# Patient Record
Sex: MALE | Race: BLACK OR AFRICAN AMERICAN | NOT HISPANIC OR LATINO | URBAN - METROPOLITAN AREA
[De-identification: names, ages, dates, MRNs, and addresses within clinical notes are randomized per-mention and may not be internally consistent; named-entity substitution may affect disease eponyms.]

---

## 2023-05-31 ENCOUNTER — EMERGENCY (EMERGENCY)
Facility: HOSPITAL | Age: 23
LOS: 0 days | Discharge: HOME | End: 2023-05-31
Payer: MEDICAID

## 2023-05-31 VITALS
DIASTOLIC BLOOD PRESSURE: 77 MMHG | TEMPERATURE: 98 F | HEART RATE: 64 BPM | SYSTOLIC BLOOD PRESSURE: 119 MMHG | WEIGHT: 214.95 LBS | HEIGHT: 73 IN | OXYGEN SATURATION: 100 % | RESPIRATION RATE: 18 BRPM

## 2023-05-31 VITALS
HEART RATE: 68 BPM | DIASTOLIC BLOOD PRESSURE: 73 MMHG | OXYGEN SATURATION: 100 % | TEMPERATURE: 98 F | RESPIRATION RATE: 18 BRPM | SYSTOLIC BLOOD PRESSURE: 121 MMHG

## 2023-05-31 DIAGNOSIS — Z20.822 CONTACT WITH AND (SUSPECTED) EXPOSURE TO COVID-19: ICD-10-CM

## 2023-05-31 DIAGNOSIS — J01.90 ACUTE SINUSITIS, UNSPECIFIED: ICD-10-CM

## 2023-05-31 DIAGNOSIS — R51.9 HEADACHE, UNSPECIFIED: ICD-10-CM

## 2023-05-31 DIAGNOSIS — R09.81 NASAL CONGESTION: ICD-10-CM

## 2023-05-31 LAB
ALBUMIN SERPL ELPH-MCNC: 3.5 G/DL — SIGNIFICANT CHANGE UP (ref 3.3–5)
ALP SERPL-CCNC: 80 U/L — SIGNIFICANT CHANGE UP (ref 40–120)
ALT FLD-CCNC: 25 U/L — SIGNIFICANT CHANGE UP (ref 12–78)
ANION GAP SERPL CALC-SCNC: 4 MMOL/L — LOW (ref 5–17)
AST SERPL-CCNC: 24 U/L — SIGNIFICANT CHANGE UP (ref 15–37)
BASOPHILS # BLD AUTO: 0.05 K/UL — SIGNIFICANT CHANGE UP (ref 0–0.2)
BASOPHILS NFR BLD AUTO: 0.4 % — SIGNIFICANT CHANGE UP (ref 0–2)
BILIRUB SERPL-MCNC: 0.4 MG/DL — SIGNIFICANT CHANGE UP (ref 0.2–1.2)
BUN SERPL-MCNC: 15 MG/DL — SIGNIFICANT CHANGE UP (ref 7–23)
CALCIUM SERPL-MCNC: 9.2 MG/DL — SIGNIFICANT CHANGE UP (ref 8.5–10.1)
CHLORIDE SERPL-SCNC: 105 MMOL/L — SIGNIFICANT CHANGE UP (ref 96–108)
CO2 SERPL-SCNC: 28 MMOL/L — SIGNIFICANT CHANGE UP (ref 22–31)
CREAT SERPL-MCNC: 1.18 MG/DL — SIGNIFICANT CHANGE UP (ref 0.5–1.3)
EGFR: 88 ML/MIN/1.73M2 — SIGNIFICANT CHANGE UP
EOSINOPHIL # BLD AUTO: 0.23 K/UL — SIGNIFICANT CHANGE UP (ref 0–0.5)
EOSINOPHIL NFR BLD AUTO: 1.7 % — SIGNIFICANT CHANGE UP (ref 0–6)
GLUCOSE SERPL-MCNC: 94 MG/DL — SIGNIFICANT CHANGE UP (ref 70–99)
HCT VFR BLD CALC: 45.4 % — SIGNIFICANT CHANGE UP (ref 39–50)
HGB BLD-MCNC: 14.7 G/DL — SIGNIFICANT CHANGE UP (ref 13–17)
HPIV3 RNA SPEC QL NAA+PROBE: DETECTED
IMM GRANULOCYTES NFR BLD AUTO: 0.5 % — SIGNIFICANT CHANGE UP (ref 0–0.9)
LYMPHOCYTES # BLD AUTO: 23.8 % — SIGNIFICANT CHANGE UP (ref 13–44)
LYMPHOCYTES # BLD AUTO: 3.14 K/UL — SIGNIFICANT CHANGE UP (ref 1–3.3)
MCHC RBC-ENTMCNC: 28.9 PG — SIGNIFICANT CHANGE UP (ref 27–34)
MCHC RBC-ENTMCNC: 32.4 G/DL — SIGNIFICANT CHANGE UP (ref 32–36)
MCV RBC AUTO: 89.2 FL — SIGNIFICANT CHANGE UP (ref 80–100)
MONOCYTES # BLD AUTO: 0.95 K/UL — HIGH (ref 0–0.9)
MONOCYTES NFR BLD AUTO: 7.2 % — SIGNIFICANT CHANGE UP (ref 2–14)
NEUTROPHILS # BLD AUTO: 8.74 K/UL — HIGH (ref 1.8–7.4)
NEUTROPHILS NFR BLD AUTO: 66.4 % — SIGNIFICANT CHANGE UP (ref 43–77)
NRBC # BLD: 0 /100 WBCS — SIGNIFICANT CHANGE UP (ref 0–0)
PLATELET # BLD AUTO: 232 K/UL — SIGNIFICANT CHANGE UP (ref 150–400)
POTASSIUM SERPL-MCNC: 4.2 MMOL/L — SIGNIFICANT CHANGE UP (ref 3.5–5.3)
POTASSIUM SERPL-SCNC: 4.2 MMOL/L — SIGNIFICANT CHANGE UP (ref 3.5–5.3)
PROT SERPL-MCNC: 8.1 GM/DL — SIGNIFICANT CHANGE UP (ref 6–8.3)
RAPID RVP RESULT: DETECTED
RBC # BLD: 5.09 M/UL — SIGNIFICANT CHANGE UP (ref 4.2–5.8)
RBC # FLD: 12.6 % — SIGNIFICANT CHANGE UP (ref 10.3–14.5)
SARS-COV-2 RNA SPEC QL NAA+PROBE: SIGNIFICANT CHANGE UP
SODIUM SERPL-SCNC: 137 MMOL/L — SIGNIFICANT CHANGE UP (ref 135–145)
WBC # BLD: 13.18 K/UL — HIGH (ref 3.8–10.5)
WBC # FLD AUTO: 13.18 K/UL — HIGH (ref 3.8–10.5)

## 2023-05-31 PROCEDURE — 99284 EMERGENCY DEPT VISIT MOD MDM: CPT

## 2023-05-31 PROCEDURE — 70450 CT HEAD/BRAIN W/O DYE: CPT | Mod: 26,MA

## 2023-05-31 RX ORDER — FLUTICASONE PROPIONATE 50 MCG
2 SPRAY, SUSPENSION NASAL
Qty: 1 | Refills: 0
Start: 2023-05-31

## 2023-05-31 RX ORDER — SODIUM CHLORIDE 9 MG/ML
1000 INJECTION INTRAMUSCULAR; INTRAVENOUS; SUBCUTANEOUS ONCE
Refills: 0 | Status: COMPLETED | OUTPATIENT
Start: 2023-05-31 | End: 2023-05-31

## 2023-05-31 RX ORDER — ACETAMINOPHEN 500 MG
1000 TABLET ORAL ONCE
Refills: 0 | Status: COMPLETED | OUTPATIENT
Start: 2023-05-31 | End: 2023-05-31

## 2023-05-31 RX ORDER — PSEUDOEPHEDRINE HCL 30 MG
60 TABLET ORAL ONCE
Refills: 0 | Status: COMPLETED | OUTPATIENT
Start: 2023-05-31 | End: 2023-05-31

## 2023-05-31 RX ORDER — IBUPROFEN 200 MG
1 TABLET ORAL
Qty: 16 | Refills: 0
Start: 2023-05-31 | End: 2023-06-03

## 2023-05-31 RX ADMIN — Medication 400 MILLIGRAM(S): at 16:46

## 2023-05-31 RX ADMIN — SODIUM CHLORIDE 1000 MILLILITER(S): 9 INJECTION INTRAMUSCULAR; INTRAVENOUS; SUBCUTANEOUS at 16:47

## 2023-05-31 RX ADMIN — Medication 60 MILLIGRAM(S): at 16:46

## 2023-05-31 RX ADMIN — Medication 1 TABLET(S): at 17:48

## 2023-05-31 NOTE — ED ADULT NURSE NOTE - NS ED NURSE IV DC DT
Location: Face
Debridement: No
Scalp Incubation Time: 2 Hours
Detail Level: Zone
Neck Incubation Time: 1 Hour
Pdt Type: EFREM-U
Frequency Of Pdt: Single Treatment
Face And Scalp Incubation Time: 1 Hour for the face and 2 Hours for the scalp
Consent: The procedure and risks were reviewed with the patient including but not limited to: burning, pigmentary changes, pain, blistering, scabbing, redness, and the possibility of needing numerous treatments. Strict photoprotection after the procedure was also discussed.
Face Incubation Time: 1.5 Hours
Photosensitizer: Levulan
31-May-2023 18:39

## 2023-05-31 NOTE — ED PROVIDER NOTE - CLINICAL SUMMARY MEDICAL DECISION MAKING FREE TEXT BOX
24 y/o male with no PMH presents with left sided headache with pressure in the left eye. Nasal congestion. NO neurological deficits. Vs stable.   Will check labs, ct head, ivf, tylenol and sudafed ordered. 24 y/o male with no PMH presents with left sided headache with pressure in the left eye. Nasal congestion. NO neurological deficits. Vs stable.   Will check labs, ct head, ivf, tylenol and sudafed ordered.    labs reviewed and wbc 13.   ct head: Abnormal calcification involving within the posterior fossa region as   described above.  Sinus disease as described above. pt made aware of ct findings. Symptoms likely related to sinusitis.  Pt stable to be discharged home and follow up with ENT.

## 2023-05-31 NOTE — ED ADULT NURSE NOTE - NSFALLHARMRISKINTERV_ED_ALL_ED

## 2023-05-31 NOTE — ED PROVIDER NOTE - NSFOLLOWUPINSTRUCTIONS_ED_ALL_ED_FT
Rest, drink plenty of fluids.  Advance activity as tolerated.  Continue all previously prescribed medications as directed.  Follow up with ENT in 48-72 hours- bring copies of your results.  Return to the ER for worsening or persistent symptoms, and/or ANY NEW OR CONCERNING SYMPTOMS. If you have issues obtaining follow up, please call: [phone] to obtain a doctor or specialist who takes your insurance in your area.  You may call [phone] to make an appointment with the internal medicine clinic.

## 2023-05-31 NOTE — ED PROVIDER NOTE - NS ED ROS FT
CONSTITUTIONAL: No fever, no chills, no fatigue  EYES: No visual changes  ENT: No ear pain, no sore throat  CARDIOVASCULAR: No chest pain, no palpitations  RESPIRATORY: No cough, no SOB  GI: No abdominal pain, no nausea, no vomiting, no constipation, no diarrhea  GENITOURINARY: No dysuria, no frequency, no hematuria  MUSKULOSKELETAL: No backpain, no joint pain, no myalgias  SKIN: No rash  NEURO: No dizziness     ALL OTHER SYSTEMS NEGATIVE.

## 2023-05-31 NOTE — ED PROVIDER NOTE - PATIENT PORTAL LINK FT
You can access the FollowMyHealth Patient Portal offered by [facility] by registering at the following website: [email or web address]. By joining [facility]’s FollowMyHealth portal, you will also be able to view your health information using other applications (apps) compatible with our system.

## 2023-05-31 NOTE — ED PROVIDER NOTE - PHYSICAL EXAMINATION
GEN: Awake, alert, interactive, NAD.  HEAD AND NECK: NC/AT. Airway patent. Neck supple.   EYES:  Clear b/l. EOMI. PERRL.   ENT: Moist mucus membranes. (+) nasal congestion  CARDIAC: Regular rate, regular rhythm. No evident pedal edema.    RESP/CHEST: Normal respiratory effort with no use of accessory muscles or retractions. Clear throughout on auscultation.  ABD: soft, non-distended, non-tender. No rebound, no guarding.   BACK: No midline spinal TTP. No CVAT.   EXTREMITIES: Moving all extremities with no apparent deformities.   SKIN: Warm, dry, intact normal color. No rash.   NEURO: AOx3, CN II-XII grossly intact, no focal deficits.   PSYCH: Appropriate mood and affect. GEN: Awake, alert, interactive, NAD.  HEAD AND NECK: NC/AT. Airway patent. Neck supple. (+) mild frontal tenderness to palpation.   EYES:  Clear b/l. EOMI. PERRL.   ENT: Moist mucus membranes. (+) nasal congestion  CARDIAC: Regular rate, regular rhythm. No evident pedal edema.    RESP/CHEST: Normal respiratory effort with no use of accessory muscles or retractions. Clear throughout on auscultation.  ABD: soft, non-distended, non-tender. No rebound, no guarding.   BACK: No midline spinal TTP. No CVAT.   EXTREMITIES: Moving all extremities with no apparent deformities.   SKIN: Warm, dry, intact normal color. No rash.   NEURO: AOx3, CN II-XII grossly intact, no focal deficits.   PSYCH: Appropriate mood and affect.

## 2023-05-31 NOTE — ED ADULT NURSE NOTE - OBJECTIVE STATEMENT
c/o l eye area pressure sensation with intermittent headaches x 5 days pain upon palpation denies any visual changes or deficits Pt is A&OX4, ambulatory. Complaining of left eye pressure and intermittent headache for 5 days. Also ear pain. Took pain meds with some relief. This morning pain increased. Denies visual changes. no pmh

## 2023-05-31 NOTE — ED ADULT TRIAGE NOTE - CHIEF COMPLAINT QUOTE
c/o l eye area pressure sensation with intermittent headaches x 5 days pain upon palpation denies any visual changes or deficits

## 2023-05-31 NOTE — ED PROVIDER NOTE - OBJECTIVE STATEMENT
22 y/o male with no PMH presents with left sided headache x 4 days. Pt reports having pressure in the left eye and forehead, on and off. Pt states he has been taking benadryl and claritin and ibuprofen with mild relief. Pt also reports having nasal congestion. Denies fever, chills, vision changes, nausea, vomiting, numbness, tingling. Pt denies flashing lights, floaters. Denies coughing

## 2023-05-31 NOTE — ED PROVIDER NOTE - CARE PROVIDER_API CALL
Tung Hinkle  Otolaryngology  [address], [place] [address]  Phone: [phone]  Fax: [phone]  Follow Up Time: 4-6 Days

## 2023-05-31 NOTE — ED PROVIDER NOTE - CARE PLAN
1 Principal Discharge DX:	Headache   Principal Discharge DX:	Headache  Secondary Diagnosis:	Acute sinusitis

## 2025-07-10 NOTE — ED ADULT NURSE NOTE - NSFALLRISK_ED_ALL_ED
EMERGENCY DEPARTMENT ENCOUNTER  Room Number:  16/16  PCP: Dennis Kwong MD  Independent Historians: Patient      HPI:  Chief Complaint: had concerns including Syncope and Hypotension.       Context: Omar Choudhary is a 76 y.o. male with a medical history of CABG, anticoagulated on warfarin, DVT, coronary artery disease who presents to the ED c/o acute hypotension and near syncope.  The patient reports that he went to the neurosurgery office today and his blood pressure was low.  He went to the cardiologist office and had a near syncopal episode.  He states that he was sitting in the waiting room and his name was called and he stood up and felt lightheaded.  The cardiologist did an echo and sent him here for further evaluation.  He received fluids in the cardiology office.  He denies any chest pain or shortness of breath.  He denies abdominal pain.  He denies nausea or vomiting.  He states he was sent home on Lasix as well as losartan hydrochlorothiazide.  He reports he had a CABG about 20 days ago.      Review of prior external notes (non-ED) -and- Review of prior external test results outside of this encounter: Laboratory evaluation earlier today shows a BMP with a creatinine of 1.61    Prescription drug monitoring program review:         PAST MEDICAL HISTORY  Active Ambulatory Problems     Diagnosis Date Noted    Benign prostatic hyperplasia 09/26/2019    Bursitis 11/11/2016    Depression 10/12/2012    Diverticulosis 09/26/2019    Family history of malignant neoplasm of breast 09/26/2019    Hyperlipidemia 09/26/2019    Hypertension 03/01/2012    Internal derangement of right knee 05/14/2019    Knee effusion 12/13/2012    Asthma 02/14/2019    Obstructive sleep apnea syndrome 09/18/2012    Osteoarthritis 09/26/2019    Osteopenia 09/26/2019    Pain in knee 11/08/2012    Postnasal drip 01/16/2018    Prepatellar bursitis 11/12/2012    Sciatica of right side 08/29/2017    Diaphragm paralysis 09/26/2019     Spinal stenosis of lumbar region with neurogenic claudication 08/12/2021    Degenerative lumbar spinal stenosis 08/12/2021    Anxiety 08/12/2021    Pinguecula of right eye 05/06/2021    Lumbar radiculopathy, chronic 09/26/2022    Bilateral pseudophakia 06/14/2022    Hemorrhoids without complication 02/29/2008    Lumbar degenerative disc disease 09/25/2023    Lumbar spondylosis 09/25/2023    Other fecal abnormalities 08/20/2013    Rectal bleeding 08/20/2013    Scoliosis of lumbosacral region due to degenerative disease of spine in adult 09/25/2023    Arthritis 09/25/2023    Acute right-sided low back pain without sciatica 01/02/2025    Sacroiliitis 01/02/2025    Acquired spondylolisthesis 01/02/2025    Acute cor pulmonale 01/14/2025    Chronic low back pain 01/14/2025    Anemia 01/15/2025    History of pulmonary embolism 01/19/2025    Acute respiratory failure with hypoxia 01/19/2025    Acute deep vein thrombosis (DVT) of femoral vein of right lower extremity 01/19/2025    CAD (coronary artery disease) 06/18/2025    Abnormal findings on diagnostic imaging of other specified body structures 06/02/2025    S/P CABG x 5 07/10/2025     Resolved Ambulatory Problems     Diagnosis Date Noted    Acute suppurative otitis media 09/24/2016    Otitis externa 01/13/2015    Otitis media 01/13/2015    Chronic cough 02/14/2019    Reactive airway disease 01/15/2016    Other specified dorsopathies, site unspecified 09/26/2019    Shortness of breath 07/24/2014    Weight loss 03/15/2013    Wheezing 03/01/2012    Asthma 11/16/2021    Obstructive sleep apnea syndrome 11/16/2021    Diverticulosis of colon 02/29/2008    Hypercholesterolemia 09/25/2023    Hypertension 09/25/2023    Acute saddle pulmonary embolism 01/10/2025    Pulmonary hypertension 03/06/2025     Past Medical History:   Diagnosis Date    ADHD (attention deficit hyperactivity disorder) 2021    Allergic 08/20/2018    Carpal tunnel syndrome     Cataract     Deep vein thrombosis  01/10/2025    Erectile dysfunction     Leg pain, right     Low back pain     Neuropathy     Poor balance     Pulmonary embolism     Scoliosis 1960    Sleep apnea     Substance abuse 1966         PAST SURGICAL HISTORY  Past Surgical History:   Procedure Laterality Date    CARDIAC CATHETERIZATION  1992    CARDIAC CATHETERIZATION N/A 01/11/2025    Procedure: Right Heart Cath;  Surgeon: Nancy Hdez MD;  Location:  [facility];  Service: Cardiovascular;  Laterality: N/A;    CARDIAC CATHETERIZATION  01/11/2025    Procedure: Percutaneous Manual Thrombectomy;  Surgeon: Nancy Hdez MD;  Location:  [facility];  Service: Cardiovascular;;    CARDIAC CATHETERIZATION Bilateral 01/11/2025    Procedure: Pulmonary angiography;  Surgeon: Nancy Hdez MD;  Location:  [facility];  Service: Cardiovascular;  Laterality: Bilateral;    CARDIAC CATHETERIZATION N/A 6/18/2025    Procedure: Right and Left Heart Cath;  Surgeon: Nancy Hdez MD;  Location:  [facility];  Service: Cardiovascular;  Laterality: N/A;    CARDIAC CATHETERIZATION N/A 6/18/2025    Procedure: Coronary angiography;  Surgeon: Nancy Hdez MD;  Location:  [facility];  Service: Cardiovascular;  Laterality: N/A;    COLONOSCOPY  2013    No polyps, slight diverticulitis    CORONARY ARTERY BYPASS GRAFT N/A 6/20/2025    Procedure: SHE STERNOTOMY CORONARY ARTERY BYPASS GRAFT TIMES 5 USING LEFT INTERNAL MAMMARY ARTERY AND LEFT GREATER SAPHENOUS VEIN GRAFT PER ENDOSCOPIC VEIN HARVESTING, CLOSURE OF PFO, EXCLUSION OF LEFT ATRIAL APPENDAGE AND PRP;  Surgeon: Jr Chago Simon MD;  Location: [facility];  Service: Cardiothoracic;  Laterality: N/A;    EYE SURGERY  2016    Cataracts    KNEE ARTHROSCOPY W/ ACL RECONSTRUCTION Right 2019    LUMBAR FUSION Bilateral 10/05/2022    Procedure: DAY 2 LUMBAR LAMINECTOMY TRANSFORAMINAL LUMBAR INTERBODY FUSION L2,L3,L4 WITH NEURO ROBOT;   Surgeon: John Do MD;  Location: [facility] MAIN OR;  Service: Robotics - Neuro;  Laterality: Bilateral;    LUMBAR FUSION N/A 10/04/2022    Procedure: DAY 1 LUMBAR LATERAL INTERBODY FUSION WITH NEURO ROBOT L2, L3.L4;  Surgeon: John Do MD;  Location: [facility] MAIN OR;  Service: Robotics - Neuro;  Laterality: N/A;  left side approach    MOUTH SURGERY      SPINE SURGERY  10/4&2022    Dr. John Do, [facility] group         FAMILY HISTORY  Family History   Problem Relation Age of Onset    Heart disease Mother     Hypertension Mother     Breast cancer Mother     Stroke Mother         Several TIAs    Alcohol abuse Mother     Thyroid disease Mother     Arthritis Mother     Cancer Mother         Breast    Aortic aneurysm Father     Heart attack Father     Liver cancer Father     Cancer Father         Liver    Heart disease Father         Heart attack    Early death Brother         Coronary thrombosis @ 45 years while mountain climbing.    Heart attack Brother         , at 47 years, fatal         SOCIAL HISTORY  Social History     Socioeconomic History    Marital status: Single   Tobacco Use    Smoking status: Former     Current packs/day: 0.00     Average packs/day: 0.5 packs/day for 26.0 years (13.0 ttl pk-yrs)     Types: Cigarettes     Start date: 1966     Quit date: 1992     Years since quittin.5     Passive exposure: Past    Smokeless tobacco: Never   Vaping Use    Vaping status: Never Used   Substance and Sexual Activity    Alcohol use: Yes     Alcohol/week: 16.0 standard drinks of alcohol     Types: 14 Glasses of wine, 2 Cans of beer per week    Drug use: Never     Comment: CBD gummies- stop now for surgery    Sexual activity: Defer     Partners: Female     Birth control/protection: Post-menopausal     Comment: We're both >70 years old       Chronic or social conditions impacting patient care (Social Determinants of Health):  Social Drivers of Health     Tobacco Use: Medium  Risk (7/10/2025)    Patient History     Smoking Tobacco Use: Former     Smokeless Tobacco Use: Never     Passive Exposure: Past   Alcohol Use: Not At Risk (6/23/2025)    AUDIT-C     Frequency of Alcohol Consumption: 4 or more times a week     Average Number of Drinks: 1 or 2     Frequency of Binge Drinking: Never   Recent Concern: Alcohol Use - Alcohol Misuse (6/18/2025)    AUDIT-C     Frequency of Alcohol Consumption: 4 or more times a week     Average Number of Drinks: 1 or 2     Frequency of Binge Drinking: Daily or almost daily   Financial Resource Strain: Not on file   Food Insecurity: No Food Insecurity (6/19/2025)    Hunger Vital Sign     Worried About Running Out of Food in the Last Year: Never true     Ran Out of Food in the Last Year: Never true   Transportation Needs: No Transportation Needs (6/19/2025)    PRAPARE - Transportation     Lack of Transportation (Medical): No     Lack of Transportation (Non-Medical): No   Physical Activity: Sufficiently Active (6/19/2025)    Exercise Vital Sign     Days of Exercise per Week: 3 days     Minutes of Exercise per Session: 60 min   Stress: Not on file   Social Connections: Unknown (6/19/2025)    Family and Community Support     Help with Day-to-Day Activities: I don't need any help     Lonely or Isolated: Not on file   Interpersonal Safety: Not At Risk (7/10/2025)    Abuse Screen     Unsafe at Home or Work/School: no     Feels Threatened by Someone?: no     Does Anyone Keep You from Contacting Others or Doint Things Outside the Home?: no     Physical Sign of Abuse Present: no   Depression: Not at risk (4/29/2025)    PHQ-2     PHQ-2 Score: 0   Housing Stability: Not At Risk (6/23/2025)    Housing Stability     Current Living Arrangements: home     Potentially Unsafe Housing Conditions: none   Utilities: Not At Risk (6/19/2025)    [facility] Utilities     Threatened with loss of utilities: No   Health Literacy: Not At Risk (6/19/2025)    Education     Help with school or  training?: No     Preferred Language: English   Employment: Not on file   Disabilities: Not At Risk (6/18/2025)    Disabilities     Concentrating, Remembering, or Making Decisions Difficulty: no     Doing Errands Independently Difficulty: no       ALLERGIES  Statins      REVIEW OF SYSTEMS  Review of Systems  Included in HPI  All systems reviewed and negative except for those discussed in HPI.      PHYSICAL EXAM    I have reviewed the triage vital signs and nursing notes.    ED Triage Vitals   Temp Heart Rate Resp BP SpO2   07/10/25 1538 07/10/25 1539 07/10/25 1540 07/10/25 1539 07/10/25 1540   97.7 °F (36.5 °C) 66 15 102/68 98 %      Temp src Heart Rate Source Patient Position BP Location FiO2 (%)   07/10/25 1538 -- -- -- --   Oral           Physical Exam  GENERAL: Awake, alert, no acute distress  SKIN: Warm, dry  HENT: Normocephalic, atraumatic  EYES: no scleral icterus  CV: regular rhythm, regular rate  RESPIRATORY: normal effort, lungs clear  ABDOMEN: soft, nontender, nondistended  MUSCULOSKELETAL: no deformity, no calf tenderness or swelling  NEURO: alert, moves all extremities, follows commands            LAB RESULTS  Recent Results (from the past 24 hours)   Basic Metabolic Panel    Collection Time: 07/10/25  2:11 PM    Specimen: Blood   Result Value Ref Range    Glucose 93 65 - 99 mg/dL    BUN 26.0 (H) 8.0 - 23.0 mg/dL    Creatinine 1.61 (H) 0.76 - 1.27 mg/dL    Sodium 137 136 - 145 mmol/L    Potassium 3.6 3.5 - 5.2 mmol/L    Chloride 101 98 - 107 mmol/L    CO2 21.9 (L) 22.0 - 29.0 mmol/L    Calcium 9.4 8.6 - 10.5 mg/dL    BUN/Creatinine Ratio 16.1 7.0 - 25.0    Anion Gap 14.1 5.0 - 15.0 mmol/L    eGFR 43.8 (L) >60.0 mL/min/1.73   CBC Auto Differential    Collection Time: 07/10/25  2:11 PM    Specimen: Blood   Result Value Ref Range    WBC 8.22 3.40 - 10.80 10*3/mm3    RBC 3.97 (L) 4.14 - 5.80 10*6/mm3    Hemoglobin 12.2 (L) 13.0 - 17.7 g/dL    Hematocrit 38.0 37.5 - 51.0 %    MCV 95.7 79.0 - 97.0 fL    MCH  30.7 26.6 - 33.0 pg    MCHC 32.1 31.5 - 35.7 g/dL    RDW 13.4 12.3 - 15.4 %    RDW-SD 47.8 37.0 - 54.0 fl    MPV 9.1 6.0 - 12.0 fL    Platelets 309 140 - 450 10*3/mm3    Neutrophil % 75.6 42.7 - 76.0 %    Lymphocyte % 11.9 (L) 19.6 - 45.3 %    Monocyte % 9.7 5.0 - 12.0 %    Eosinophil % 1.7 0.3 - 6.2 %    Basophil % 0.6 0.0 - 1.5 %    Immature Grans % 0.5 0.0 - 0.5 %    Neutrophils, Absolute 6.21 1.70 - 7.00 10*3/mm3    Lymphocytes, Absolute 0.98 0.70 - 3.10 10*3/mm3    Monocytes, Absolute 0.80 0.10 - 0.90 10*3/mm3    Eosinophils, Absolute 0.14 0.00 - 0.40 10*3/mm3    Basophils, Absolute 0.05 0.00 - 0.20 10*3/mm3    Immature Grans, Absolute 0.04 0.00 - 0.05 10*3/mm3    nRBC 0.0 0.0 - 0.2 /100 WBC   High Sensitivity Troponin T    Collection Time: 07/10/25  2:11 PM    Specimen: Blood   Result Value Ref Range    HS Troponin T 358 (C) <22 ng/L   Adult Transthoracic Echo Limited W/ Cont if Necessary Per Protocol    Collection Time: 07/10/25  3:17 PM   Result Value Ref Range    EF(MOD-bp) 63.7 %    LVIDd 4.2 cm    LVIDs 3.1 cm    IVSd 1.10 cm    LVPWd 1.20 cm    FS 26.1 %    IVS/LVPW 0.92 cm    ESV(cubed) 29.9 ml    LV Sys Vol (BSA corrected) 17.6 cm2    EDV(cubed) 74.1 ml    LV Tyson Vol (BSA corrected) 44.9 cm2    LV mass(C)d 167.4 grams    EDV(MOD-sp2) 102.0 ml    EDV(MOD-sp4) 84.0 ml    ESV(MOD-sp2) 37.0 ml    ESV(MOD-sp4) 33.0 ml    SV(MOD-sp2) 65.0 ml    SV(MOD-sp4) 51.0 ml    SVi(MOD-SP2) 34.7 ml/m2    SVi(MOD-SP4) 27.3 ml/m2    EF(MOD-sp2) 63.7 %    EF(MOD-sp4) 60.7 %    MV E max silvio 121.0 cm/sec    MV A max silvio 58.5 cm/sec    MV dec time 0.23 sec    MV E/A 2.07     LA ESV Index (BP) 38.3 ml/m2    Med Peak E' Silvio 5.4 cm/sec    Lat Peak E' Silvio 13.8 cm/sec    TR max silvio 181.1 cm/sec    Avg E/e' ratio 12.60     RV S' 7.2 cm/sec    MV max PG 7.7 mmHg    MV mean PG 2.44 mmHg    MV V2 VTI 41.3 cm    MV P1/2t 76.0 msec    MVA(P1/2t) 2.9 cm2    MV dec slope 500.2 cm/sec2    TR max PG 13.1 mmHg    RVSP(TR) 16.1 mmHg    RAP  systole 3.0 mmHg    EF_3D-VOL 54.0 %    3D vol index 46.0    ECG 12 Lead Syncope    Collection Time: 07/10/25  3:39 PM   Result Value Ref Range    QT Interval 417 ms    QTC Interval 433 ms   Protime-INR    Collection Time: 07/10/25  3:44 PM    Specimen: Arm, Left; Blood   Result Value Ref Range    Protime 25.8 (H) 11.7 - 14.2 Seconds    INR 2.33 (H) 0.90 - 1.10   Green Top (Gel)    Collection Time: 07/10/25  3:55 PM   Result Value Ref Range    Extra Tube Hold for add-ons.    Lavender Top    Collection Time: 07/10/25  3:55 PM   Result Value Ref Range    Extra Tube hold for add-on    Gray Top    Collection Time: 07/10/25  3:55 PM   Result Value Ref Range    Extra Tube Hold for add-ons.    Light Blue Top    Collection Time: 07/10/25  3:55 PM   Result Value Ref Range    Extra Tube Hold for add-ons.    High Sensitivity Troponin T 1Hr    Collection Time: 07/10/25  4:39 PM    Specimen: Arm, Left; Blood   Result Value Ref Range    HS Troponin T 283 (C) <22 ng/L    Troponin T Numeric Delta -75 ng/L    Troponin T % Delta -21 Abnormal if >/= 20%         RADIOLOGY  XR Chest 1 View  Result Date: 7/10/2025  ONE-VIEW PORTABLE CHEST AT 4:10 P.M.  HISTORY: Hypotension. Syncope.  FINDINGS: There is elevation of the left hemidiaphragm with some vague atelectasis at the left base as also noted on the chest CT scan dated 1/10/2025 and also unchanged from the portable chest x-ray dated 6/22/2025. The heart remains enlarged with sternal wires from previous cardiac surgery and no acute abnormality is seen.  This report was finalized on 7/10/2025 4:44 PM by Dr. Js Newman M.D on Workstation: [id number]      Adult Transthoracic Echo Limited W/ Cont if Necessary Per Protocol  Result Date: 7/10/2025    Limited study.   Left ventricular systolic function is normal. Calculated left ventricular EF = 63.7%   Left ventricular wall thickness is consistent with mild concentric hypertrophy.   Left ventricular diastolic function was  indeterminate.   There is calcification of the aortic valve.   Severe mitral annular calcification (MAC). Mild mitral valve stenosis is present. The mitral valve mean gradient is 2 mmHg at HR 66 bpm.   The left atrial cavity is moderately dilated.  Normal RA and IVC size.         MEDICATIONS GIVEN IN ER  Medications - No data to display      ORDERS PLACED DURING THIS VISIT:  Orders Placed This Encounter   Procedures    XR Chest 1 View    Protime-INR    High Sensitivity Troponin T    [place] Draw    High Sensitivity Troponin T 1Hr    Orthostatic Vitals    ECG 12 Lead Syncope    Initiate Emergency Department Observation Status    Green Top (Gel)    Lavender Top    Gray Top    Light Blue Top         OUTPATIENT MEDICATION MANAGEMENT:  Current Facility-Administered Medications Ordered in Epic   Medication Dose Route Frequency Provider Last Rate Last Admin    sodium chloride 0.9 % flush 10 mL  10 mL Intravenous Q12H Nancy Hdez MD        sodium chloride 0.9 % flush 10 mL  10 mL Intravenous PRN Nancy Hdez MD         Current Outpatient Medications Ordered in Epic   Medication Sig Dispense Refill    acetaminophen (TYLENOL) 500 MG tablet Take 1 tablet by mouth Every 6 (Six) Hours As Needed for Mild Pain.      Acetylcysteine capsule capsule Take 1 capsule by mouth Daily.      albuterol sulfate  (90 Base) MCG/ACT inhaler Inhale 2 puffs Every 4 (Four) Hours As Needed for Wheezing or Shortness of Air. 18 g 0    aspirin 81 MG EC tablet Take 1 tablet by mouth Daily. 30 tablet 11    B Complex Vitamins (VITAMIN B COMPLEX) capsule capsule Take 1 capsule by mouth Daily. LD 9-27      budesonide-formoterol (SYMBICORT) 160-4.5 MCG/ACT inhaler Inhale 2 puffs 2 (Two) Times a Day. 30.6 g 1    Calcium Carb-Cholecalciferol (Oyster Shell Calcium w/D) 500-5 MG-MCG tablet TAKE TWO TABLETS BY MOUTH DAILY 180 tablet 0    clonazePAM (KlonoPIN) 0.5 MG tablet Take 1 tablet by mouth 2 (Two) Times a Day As Needed for Anxiety. for  anxiety 30 tablet 2    coenzyme Q10 100 MG capsule Take 1 capsule by mouth Daily.      cyclobenzaprine (FLEXERIL) 10 MG tablet Take 1 tablet by mouth 3 (Three) Times a Day As Needed for Muscle Spasms. 30 tablet 1    furosemide (LASIX) 40 MG tablet Take 1 tablet by mouth Daily. 30 tablet 1    gabapentin (NEURONTIN) 400 MG capsule Take 1 capsule by mouth Every 8 (Eight) Hours for 7 days. 21 capsule 0    L-Arginine 500 MG capsule Take 1 capsule by mouth Daily.      losartan-hydrochlorothiazide (Hyzaar) 50-12.5 MG per tablet Take 1 tablet by mouth Daily. 90 tablet 1    metoprolol tartrate (LOPRESSOR) 25 MG tablet Take 1 tablet by mouth Every 12 (Twelve) Hours. 60 tablet 5    montelukast (SINGULAIR) 10 MG tablet TAKE 1 TABLET BY MOUTH ONCE DAILY 90 tablet 0    Multiple Vitamins-Minerals (EMERGEN-C BLUE PO) Take 1 tablet by mouth Daily. LD 9-27      naloxone (NARCAN) 4 MG/0.1ML nasal spray Call 911. Don't prime. [place] in 1 nostril for overdose. Repeat in 2-3 minutes in other nostril if no or minimal breathing/responsiveness. 2 each 0    S-Adenosylmethionine (YURY-e) 400 MG tablet Take 400 mg by mouth Daily.      sildenafil (REVATIO) 20 MG tablet TAKE 1 TO 2 TABLETS BY MOUTH AS NEEDED FOR ERICTILE DYSFUNCTION 50 tablet 0    theophylline (THEODUR) 300 MG 12 hr tablet Take 1 tablet by mouth Daily. 90 tablet 3    traZODone (DESYREL) 50 MG tablet Take 1-2 tablets by mouth Every Night. 180 tablet 3    triamcinolone (KENALOG) 0.025 % cream Apply 1 Application topically to the appropriate area as directed 2 (Two) Times a Day.      warfarin (COUMADIN) 5 MG tablet Take 1 tablet by mouth Daily. 30 tablet 1         PROCEDURES  Procedures            PROGRESS, DATA ANALYSIS, CONSULTS, AND MEDICAL DECISION MAKING  All labs have been independently interpreted by me.  All radiology studies have been reviewed by me. All EKG's have been independently viewed and interpreted by me.  Discussion below represents my analysis of pertinent findings  related to patient's condition, differential diagnosis, treatment plan and final disposition.    Differential diagnosis includes but is not limited to syncope, arrhythmia, orthostasis, pneumonia, dehydration, renal failure, pericardial effusion, acute aortic syndrome.    Clinical Scores:                                       ED Course as of 07/10/25 1715   Thu Jul 10, 2025   1545 Discussing with Dr. Hdez.  She thinks the patient got dehydrated with too much Lasix and antihypertensives.  She requests further workup, observation unit admission.  She reports the cardiac echo shows normal LV function.  No pericardial effusion.  Right ventricle normal which argues against a big PE. [TR]   1546 EKG PROCEDURE    EKG time: 1539  Rhythm/Rate: Normal sinus, rate 65  P waves and FL: Normal P, normal FL  QRS, axis: Narrow QRS, normal axis  ST and T waves: T wave inversions laterally    Independently Interpreted by me  Not significantly changed compared to prior 6/22/2025   [TR]   1613 XR Chest 1 View  My independent interpretation of the imaging study is no dense consolidation [TR]   1633 HS Troponin T(!!): 358 [TR]   1634 INR(!): 2.33 [TR]   1710 HS Troponin T(!!): 283 [TR]   1713 Discussing with Radha with the observation unit.  She accepts for admission. [TR]   1714 Overall patient has an increased creatinine and was hypotensive.  I think that he got over diuresed with medications addition postsurgery.  His troponins are descending.  I do not suspect PE.  I do not suspect MI.  Plan admission to the observation unit for gentle hydration, cardiology consult evaluation and adjustment of his medications. [TR]   1715 Reviewed the workup and findings with the patient at the bedside.  Answered all questions.  He is agreeable to admission. [TR]      ED Course User Index  [TR] Taz Saenz MD             AS OF 17:15 EDT VITALS:    BP - 114/98  HR - 72  TEMP - 97.7 °F (36.5 °C) (Oral)  O2 SATS - 94%    COMPLEXITY OF CARE  The  patient requires admission.      DIAGNOSIS  Final diagnoses:   Near syncope   Hypotension, unspecified hypotension type   Post-operative state         DISPOSITION  ED Disposition       ED Disposition   Decision to Admit    Condition   --    Comment   --                Please note that portions of this document were completed with a voice recognition program.    Note Disclaimer: At [facility], we believe that sharing information builds trust and better relationships. You are receiving this note because you recently visited [facility]. It is possible you will see health information before a provider has talked with you about it. This kind of information can be easy to misunderstand. To help you fully understand what it means for your health, we urge you to discuss this note with your provider.         Taz Saenz MD  07/10/25 0065     Yes